# Patient Record
(demographics unavailable — no encounter records)

---

## 2025-06-25 NOTE — PHYSICAL EXAM
[Normal Conjunctiva] : normal conjunctiva [No Carotid Bruit] : no carotid bruit [Normal S1, S2] : normal S1, S2 [Soft] : abdomen soft [Non Tender] : non-tender [Normal Bowel Sounds] : normal bowel sounds [Normal] : alert and oriented, normal memory [de-identified] : 2/6 PSM at left sternal border.

## 2025-06-25 NOTE — HISTORY OF PRESENT ILLNESS
[FreeTextEntry1] : Cici England is a 52- year- old female with heart murmur and hypercholesterolemia comes for follow up visit. Had an episode of mild chest tightness along with left arm pain lasted for couple of minutes about one and half  month ago after carrying baby. No recurrent episodes. Went to PCP, had EKG and blood work done. No shortness of breath on exertion. No palpitations.

## 2025-06-25 NOTE — DISCUSSION/SUMMARY
[FreeTextEntry1] : In a summary Alonso England is a middle- aged- female with Hypercholesterolemia, continue Rosuvastatin and low cholesterol diet. Episode of chest tightness and heart murmur, Echo done showed normal LV systolic function and mild TR. Echo results explained. dTRESS ECHO TO RULE OUT ISCHEMIA. fOLLOW UP IN 6 MONTHS.

## 2025-06-25 NOTE — REVIEW OF SYSTEMS
[Negative] : Respiratory [Blurry Vision] : no blurred vision [Dyspnea on exertion] : not dyspnea during exertion [Lower Ext Edema] : no extremity edema [Palpitations] : no palpitations [Orthopnea] : no orthopnea [PND] : no PND [Abdominal Pain] : no abdominal pain [Nausea] : no nausea [Vomiting] : no vomiting [Heartburn] : no heartburn [Joint Pain] : no joint pain [Rash] : no rash [Itching] : no itching [Dizziness] : no dizziness [Tremor] : no tremor was seen [Confusion] : no confusion was observed [Anxiety] : no anxiety [Under Stress] : not under stress [Easy Bleeding] : no tendency for easy bleeding [Easy Bruising] : no tendency for easy bruising [FreeTextEntry5] : episode of chest tightness.